# Patient Record
Sex: FEMALE | Race: WHITE | Employment: OTHER | URBAN - METROPOLITAN AREA
[De-identification: names, ages, dates, MRNs, and addresses within clinical notes are randomized per-mention and may not be internally consistent; named-entity substitution may affect disease eponyms.]

---

## 2017-04-07 ENCOUNTER — LAB ENCOUNTER (OUTPATIENT)
Dept: LAB | Facility: HOSPITAL | Age: 62
End: 2017-04-07
Attending: OTOLARYNGOLOGY
Payer: COMMERCIAL

## 2017-04-07 DIAGNOSIS — E07.9 THYROID DYSFUNCTION: ICD-10-CM

## 2017-04-07 PROCEDURE — 84439 ASSAY OF FREE THYROXINE: CPT

## 2017-04-07 PROCEDURE — 84432 ASSAY OF THYROGLOBULIN: CPT

## 2017-04-07 PROCEDURE — 36415 COLL VENOUS BLD VENIPUNCTURE: CPT

## 2017-04-07 PROCEDURE — 84443 ASSAY THYROID STIM HORMONE: CPT

## 2017-04-07 PROCEDURE — 86800 THYROGLOBULIN ANTIBODY: CPT

## 2018-04-19 ENCOUNTER — LAB ENCOUNTER (OUTPATIENT)
Dept: LAB | Facility: HOSPITAL | Age: 63
End: 2018-04-19
Attending: OTOLARYNGOLOGY
Payer: COMMERCIAL

## 2018-04-19 DIAGNOSIS — E07.9 THYROID DYSFUNCTION: ICD-10-CM

## 2018-04-19 PROCEDURE — 84481 FREE ASSAY (FT-3): CPT

## 2018-04-19 PROCEDURE — 36415 COLL VENOUS BLD VENIPUNCTURE: CPT

## 2018-04-19 PROCEDURE — 84432 ASSAY OF THYROGLOBULIN: CPT

## 2018-04-19 PROCEDURE — 84482 T3 REVERSE: CPT

## 2018-04-19 PROCEDURE — 84443 ASSAY THYROID STIM HORMONE: CPT

## 2018-04-19 PROCEDURE — 84439 ASSAY OF FREE THYROXINE: CPT

## 2018-04-19 PROCEDURE — 86800 THYROGLOBULIN ANTIBODY: CPT

## 2018-04-21 NOTE — PROGRESS NOTES
Informed pt of results and recommendations per KO. Pt voiced understanding. Medication refilled and future order placed.

## 2018-05-31 ENCOUNTER — CHARTING TRANS (OUTPATIENT)
Dept: OTHER | Age: 63
End: 2018-05-31

## 2018-11-01 VITALS
HEART RATE: 74 BPM | OXYGEN SATURATION: 96 % | RESPIRATION RATE: 16 BRPM | HEIGHT: 64 IN | BODY MASS INDEX: 38.58 KG/M2 | TEMPERATURE: 98.4 F | DIASTOLIC BLOOD PRESSURE: 74 MMHG | WEIGHT: 226 LBS | SYSTOLIC BLOOD PRESSURE: 134 MMHG

## 2019-04-08 ENCOUNTER — LAB ENCOUNTER (OUTPATIENT)
Dept: LAB | Facility: HOSPITAL | Age: 64
End: 2019-04-08
Attending: OTOLARYNGOLOGY
Payer: COMMERCIAL

## 2019-04-08 DIAGNOSIS — E07.9 THYROID DYSFUNCTION: ICD-10-CM

## 2019-04-08 DIAGNOSIS — Z85.850 HX OF PAPILLARY THYROID CARCINOMA: ICD-10-CM

## 2019-04-08 PROCEDURE — 86800 THYROGLOBULIN ANTIBODY: CPT

## 2019-04-08 PROCEDURE — 84432 ASSAY OF THYROGLOBULIN: CPT

## 2019-04-08 PROCEDURE — 36415 COLL VENOUS BLD VENIPUNCTURE: CPT

## 2019-04-08 PROCEDURE — 84439 ASSAY OF FREE THYROXINE: CPT

## 2019-04-08 PROCEDURE — 84481 FREE ASSAY (FT-3): CPT

## 2019-04-08 PROCEDURE — 84443 ASSAY THYROID STIM HORMONE: CPT

## 2020-06-06 ENCOUNTER — LAB ENCOUNTER (OUTPATIENT)
Dept: LAB | Facility: HOSPITAL | Age: 65
End: 2020-06-06
Attending: OTOLARYNGOLOGY
Payer: COMMERCIAL

## 2020-06-06 DIAGNOSIS — E07.9 DISORDER OF THYROID GLAND: ICD-10-CM

## 2020-06-06 PROCEDURE — 84432 ASSAY OF THYROGLOBULIN: CPT

## 2020-06-06 PROCEDURE — 84439 ASSAY OF FREE THYROXINE: CPT

## 2020-06-06 PROCEDURE — 86800 THYROGLOBULIN ANTIBODY: CPT

## 2020-06-06 PROCEDURE — 36415 COLL VENOUS BLD VENIPUNCTURE: CPT

## 2020-06-06 PROCEDURE — 84443 ASSAY THYROID STIM HORMONE: CPT

## 2020-06-06 PROCEDURE — 84481 FREE ASSAY (FT-3): CPT

## 2021-05-22 ENCOUNTER — LAB ENCOUNTER (OUTPATIENT)
Dept: LAB | Facility: HOSPITAL | Age: 66
End: 2021-05-22
Attending: OTOLARYNGOLOGY
Payer: COMMERCIAL

## 2021-05-22 DIAGNOSIS — E07.9 THYROID DYSFUNCTION: ICD-10-CM

## 2021-05-22 PROCEDURE — 84443 ASSAY THYROID STIM HORMONE: CPT

## 2021-05-22 PROCEDURE — 36415 COLL VENOUS BLD VENIPUNCTURE: CPT

## 2021-05-22 PROCEDURE — 84481 FREE ASSAY (FT-3): CPT

## 2021-05-22 PROCEDURE — 84439 ASSAY OF FREE THYROXINE: CPT

## 2022-04-09 ENCOUNTER — LAB ENCOUNTER (OUTPATIENT)
Dept: LAB | Facility: HOSPITAL | Age: 67
End: 2022-04-09
Attending: OTOLARYNGOLOGY
Payer: MEDICARE

## 2022-04-09 DIAGNOSIS — C73 PAPILLARY THYROID CARCINOMA (HCC): ICD-10-CM

## 2022-04-09 DIAGNOSIS — E07.9 THYROID DYSFUNCTION: ICD-10-CM

## 2022-04-09 LAB
T4 FREE SERPL-MCNC: 1.4 NG/DL (ref 0.8–1.7)
THYROGLOB SERPL-MCNC: <15 U/ML (ref ?–60)
TSI SER-ACNC: 0.12 MIU/ML (ref 0.36–3.74)

## 2022-04-09 PROCEDURE — 36415 COLL VENOUS BLD VENIPUNCTURE: CPT

## 2022-04-09 PROCEDURE — 84443 ASSAY THYROID STIM HORMONE: CPT

## 2022-04-09 PROCEDURE — 84439 ASSAY OF FREE THYROXINE: CPT

## 2022-04-09 PROCEDURE — 86800 THYROGLOBULIN ANTIBODY: CPT

## 2022-11-05 ENCOUNTER — LAB ENCOUNTER (OUTPATIENT)
Dept: LAB | Facility: HOSPITAL | Age: 67
End: 2022-11-05
Attending: OTOLARYNGOLOGY
Payer: MEDICARE

## 2022-11-05 DIAGNOSIS — C73 PAPILLARY THYROID CARCINOMA (HCC): Primary | ICD-10-CM

## 2022-11-05 LAB
T4 FREE SERPL-MCNC: 1.2 NG/DL (ref 0.8–1.7)
TSI SER-ACNC: 0.11 MIU/ML (ref 0.36–3.74)

## 2022-11-05 PROCEDURE — 86800 THYROGLOBULIN ANTIBODY: CPT

## 2022-11-05 PROCEDURE — 84443 ASSAY THYROID STIM HORMONE: CPT

## 2022-11-05 PROCEDURE — 84432 ASSAY OF THYROGLOBULIN: CPT

## 2022-11-05 PROCEDURE — 84439 ASSAY OF FREE THYROXINE: CPT

## 2022-11-05 PROCEDURE — 36415 COLL VENOUS BLD VENIPUNCTURE: CPT

## 2022-11-08 LAB
THYROGLOBULIN AB: <0.9 IU/ML
THYROGLOBULIN, SERUM OR PLASMA: <0.1 NG/ML